# Patient Record
Sex: MALE | Race: WHITE | NOT HISPANIC OR LATINO
[De-identification: names, ages, dates, MRNs, and addresses within clinical notes are randomized per-mention and may not be internally consistent; named-entity substitution may affect disease eponyms.]

---

## 2019-08-13 PROBLEM — Z00.00 ENCOUNTER FOR PREVENTIVE HEALTH EXAMINATION: Status: ACTIVE | Noted: 2019-08-13

## 2019-09-05 ENCOUNTER — APPOINTMENT (OUTPATIENT)
Dept: DERMATOLOGY | Facility: CLINIC | Age: 59
End: 2019-09-05
Payer: COMMERCIAL

## 2019-09-05 VITALS — BODY MASS INDEX: 26.41 KG/M2 | WEIGHT: 195 LBS | HEIGHT: 72 IN

## 2019-09-05 DIAGNOSIS — Z78.9 OTHER SPECIFIED HEALTH STATUS: ICD-10-CM

## 2019-09-05 PROCEDURE — 99203 OFFICE O/P NEW LOW 30 MIN: CPT | Mod: 25

## 2019-09-05 PROCEDURE — 11102 TANGNTL BX SKIN SINGLE LES: CPT

## 2019-09-05 NOTE — ASSESSMENT
[FreeTextEntry1] : Psoriasis on elbows and left upper intragluteal cleft area\par Actinic keratoses on scalp\par Seborrheic keratosis on left upper abdomen\par ? Cyst,? Neurofibroma left lateral temple\par ? Basal cell carcinoma,? Traumatic left upper forehead

## 2019-09-05 NOTE — PHYSICAL EXAM
[Alert] : alert [Oriented x 3] : ~L oriented x 3 [Well Nourished] : well nourished [FreeTextEntry3] : The following areas were examined and no significant abnormalities were seen except as noted below:\par \par Type II skin\par \par scalp, face, eyelids, nose, lips, ears, neck, chest, abdomen, back,groin, buttocks, right arm, left arm, right hand, left hand,\par right  leg, left leg, right foot, left foot\par \par Crown of scalp: Multiple small thin pink scaly macules\par Left upper forehead: 15 x 15 mm pink patch with 5 x 5 mm central ulceration\par Left lateral temple:  7 x 5 mm soft pink nodule\par Mild to moderately scaly plaques present on both elbows and left upper inner buttock\par Left upper abdomen: 9X 3 mm brown verrucous plaque\par \par No other suspicious lesions seen

## 2019-09-05 NOTE — HISTORY OF PRESENT ILLNESS
[FreeTextEntry1] : Sore on the forehead [de-identified] : First visit for 59-year-old white male presenting for evaluation of growths. Particularly concerned about a three-month history of a nonhealing sore on the left forehead which patient attributes to wearing a bike helmet. No history of skin cancer.

## 2019-09-13 ENCOUNTER — RESULT REVIEW (OUTPATIENT)
Age: 59
End: 2019-09-13

## 2019-09-18 LAB — CORE LAB BIOPSY: NORMAL

## 2019-10-08 ENCOUNTER — APPOINTMENT (OUTPATIENT)
Dept: DERMATOLOGY | Facility: CLINIC | Age: 59
End: 2019-10-08

## 2021-11-23 ENCOUNTER — APPOINTMENT (OUTPATIENT)
Dept: DERMATOLOGY | Facility: CLINIC | Age: 61
End: 2021-11-23
Payer: COMMERCIAL

## 2021-11-23 DIAGNOSIS — D48.5 NEOPLASM OF UNCERTAIN BEHAVIOR OF SKIN: ICD-10-CM

## 2021-11-23 PROCEDURE — 11103 TANGNTL BX SKIN EA SEP/ADDL: CPT

## 2021-11-23 PROCEDURE — 11102 TANGNTL BX SKIN SINGLE LES: CPT

## 2021-11-23 PROCEDURE — 99213 OFFICE O/P EST LOW 20 MIN: CPT | Mod: 25

## 2021-11-23 NOTE — PHYSICAL EXAM
[FreeTextEntry3] : The following areas were examined and no significant abnormalities were seen except as noted below:\par \par Type II skin\par \par scalp, face, eyelids, nose, lips, ears, neck, chest, abdomen, back,groin, buttocks, right arm, left arm, right hand, left hand,\par right  leg, left leg, right foot, left foot\par \par Left crown of scalp: 13 x 9 mm pink scaly verrucous plaque\par Scalp: Multiple small pink scaly macules with one scaly papule present on the left anterior scalp\par Face: Rare small pink scaly macules present on the left cheek\par Mid forehead: No evidence recurrence of the previously treated basal cell carcinoma\par Trunk: Rare brown verrucous plaques\par Left distal thigh: 11 x 11 mm pink scaly patch\par Left upper buttock: Focal lichenified oval erythematous plaque \par Right lateral leg: Ill-defined pink scaly patch\par \par No other suspicious lesions

## 2021-11-23 NOTE — ASSESSMENT
[FreeTextEntry1] : ? Seborrheic keratosis, rule out SCC on left scalp\par ? BCC, ? Squamous cell carcinoma in situ on left thigh\par \par

## 2021-11-23 NOTE — CONSULT LETTER
[Dear  ___] : Dear  [unfilled], [Consult Letter:] : I had the pleasure of evaluating your patient, [unfilled]. [Consult Closing:] : Thank you very much for allowing me to participate in the care of this patient.  If you have any questions, please do not hesitate to contact me. [Sincerely,] : Sincerely, [FreeTextEntry2] : Ariadna Davis MD [FreeTextEntry1] : Examination of his skin reveals lesions in the left scalp and left thigh. While the lesion on the scalp lesion may be a seborrheic keratosis, it was biopsied to rule out potential squamous cell carcinoma. The lesion in the left thigh is most likely either a basal cell carcinoma or squamous cell carcinoma in situ.  Both lesions were biopsied today and will be eradicated if the pathology is confirmatory.\par \par Please see attached chart note for further details. [FreeTextEntry3] : Eugene Hunter MD\par [address], [address]\par [place] [address]\par Tel ([phone])\par Fax ([phone])\par Private line ([phone])\par

## 2021-11-23 NOTE — HISTORY OF PRESENT ILLNESS
[FreeTextEntry1] : Evaluation of growths. [de-identified] : Followup visit for 61-year-old white male last seen him on September 5, 2019, for evaluation of growths.\par Particularly concerned about a new lesion on the scalp and a lesion on the left thigh\par \par Patient has a history of basal cell carcinoma on the left forehead which was eradicated by Mohs surgery.\par Note: Patient has history of psoriasis but did not desire treatment at the last visit.

## 2021-12-08 ENCOUNTER — NON-APPOINTMENT (OUTPATIENT)
Age: 61
End: 2021-12-08

## 2021-12-13 ENCOUNTER — APPOINTMENT (OUTPATIENT)
Dept: DERMATOLOGY | Facility: CLINIC | Age: 61
End: 2021-12-13
Payer: COMMERCIAL

## 2021-12-13 DIAGNOSIS — C44.719 BASAL CELL CARCINOMA OF SKIN OF LEFT LOWER LIMB, INCLUDING HIP: ICD-10-CM

## 2021-12-13 PROCEDURE — 17262 DSTRJ MAL LES T/A/L 1.1-2.0: CPT

## 2021-12-13 PROCEDURE — 99213 OFFICE O/P EST LOW 20 MIN: CPT | Mod: 25

## 2021-12-13 NOTE — HISTORY OF PRESENT ILLNESS
[FreeTextEntry1] : Basal cell carcinoma [de-identified] : Followup visit for a 61-year-old white male presenting for eradication via D & C. of an 11 x 11 mm biopsy proven basal cell carcinoma, superficial or nodular type,  on the left distal thigh\par \par She also complains of itchy rash on the right mid leg: Treated with daughter's prescription cream without improvement.

## 2021-12-13 NOTE — ASSESSMENT
[FreeTextEntry1] : Rash on right leg consistent with an eczematous dermatitis of some type,?? Psoriasis

## 2021-12-13 NOTE — PHYSICAL EXAM
[Alert] : alert [Oriented x 3] : ~L oriented x 3 [Well Nourished] : well nourished [FreeTextEntry3] : Left distal thigh: Wound healing with a dry crust and no evidence of infection\par \par Right mid lateral leg:  Moderate pink scaled crusted plaque without elevated edges

## 2022-06-09 ENCOUNTER — APPOINTMENT (OUTPATIENT)
Dept: DERMATOLOGY | Facility: CLINIC | Age: 62
End: 2022-06-09
Payer: COMMERCIAL

## 2022-06-09 DIAGNOSIS — L30.8 OTHER SPECIFIED DERMATITIS: ICD-10-CM

## 2022-06-09 PROCEDURE — 99213 OFFICE O/P EST LOW 20 MIN: CPT | Mod: 25

## 2022-06-09 PROCEDURE — 17000 DESTRUCT PREMALG LESION: CPT

## 2022-06-09 RX ORDER — BETAMETHASONE DIPROPIONATE 0.5 MG/G
0.05 CREAM, AUGMENTED TOPICAL
Qty: 1 | Refills: 2 | Status: ACTIVE | COMMUNITY
Start: 2021-12-13 | End: 1900-01-01

## 2022-06-09 NOTE — ASSESSMENT
[FreeTextEntry1] : Eczematous dermatitis of some type, ? Psoriasis on the right mid lateral leg\par Actinic keratoses on scalp\par ? Incipient basal cell carcinoma on left breast

## 2022-06-09 NOTE — HISTORY OF PRESENT ILLNESS
[FreeTextEntry1] : Evaluation of growths [de-identified] : Follow-up visit for 62-year-old white male last seen by me on December 13, 2021, for evaluation of growths.\par Patient has a history of a basal cell carcinoma on the left distal thigh which was eradicated via D&C at the last visit.\par \par Patient also has a history of a rash on the right leg consistent with an eczematous dermatitis of some type,??  Psoriasis.\par This was treated with betamethasone dipropionate augmented cream 0.05% 2-3 times a day as needed at the last visit. - Patient was not able to get this medication.

## 2022-06-09 NOTE — PHYSICAL EXAM
[Alert] : alert [Oriented x 3] : ~L oriented x 3 [Well Nourished] : well nourished [FreeTextEntry3] : The following areas were examined and no significant abnormalities were seen except as noted below:\par \par Type II skin\par \par scalp, face, eyelids, nose, lips, ears, neck, chest, abdomen, back,groin, buttocks, right arm, left arm, right hand, left hand,\par right  leg, left leg, right foot, left foot\par \par The following areas were examined and no significant abnormalities were seen except as noted below:\par \par Type II skin\par \par scalp, face, eyelids, nose, lips, ears, neck, chest, abdomen, back,groin, buttocks, right arm, left arm, right hand, left hand,\par right  leg, left leg, right foot, left foot\par \par Scalp: Notable small pink scaly macules\par \par Left distal thigh: Wound healed with a pink focally elevated plaque and peripheral hyperpigmentation - no obvious evidence of recurrence\par Left upper breast:  5 x 4 mm pink smooth papule\par Trauma: Rare brown verrucous plaques\par Right mid lateral leg:  Moderate pink slightly scaly plaque\par Smaller similar plaques present on the right elbow and lower back/intergluteal cleft areas\par \par No other suspicious lesions seen

## 2022-06-14 ENCOUNTER — APPOINTMENT (OUTPATIENT)
Dept: DERMATOLOGY | Facility: CLINIC | Age: 62
End: 2022-06-14

## 2022-08-31 ENCOUNTER — NON-APPOINTMENT (OUTPATIENT)
Age: 62
End: 2022-08-31

## 2023-09-28 ENCOUNTER — APPOINTMENT (OUTPATIENT)
Dept: DERMATOLOGY | Facility: CLINIC | Age: 63
End: 2023-09-28
Payer: COMMERCIAL

## 2023-09-28 DIAGNOSIS — L40.0 PSORIASIS VULGARIS: ICD-10-CM

## 2023-09-28 DIAGNOSIS — L82.1 OTHER SEBORRHEIC KERATOSIS: ICD-10-CM

## 2023-09-28 DIAGNOSIS — Z85.828 PERSONAL HISTORY OF OTHER MALIGNANT NEOPLASM OF SKIN: ICD-10-CM

## 2023-09-28 DIAGNOSIS — L57.0 ACTINIC KERATOSIS: ICD-10-CM

## 2023-09-28 PROCEDURE — 17000 DESTRUCT PREMALG LESION: CPT

## 2023-09-28 PROCEDURE — 99213 OFFICE O/P EST LOW 20 MIN: CPT | Mod: 25

## 2024-08-01 ENCOUNTER — APPOINTMENT (OUTPATIENT)
Dept: DERMATOLOGY | Facility: CLINIC | Age: 64
End: 2024-08-01
Payer: SELF-PAY

## 2024-08-01 DIAGNOSIS — L82.1 OTHER SEBORRHEIC KERATOSIS: ICD-10-CM

## 2024-08-01 DIAGNOSIS — Z85.828 PERSONAL HISTORY OF OTHER MALIGNANT NEOPLASM OF SKIN: ICD-10-CM

## 2024-08-01 DIAGNOSIS — L40.0 PSORIASIS VULGARIS: ICD-10-CM

## 2024-08-01 DIAGNOSIS — L57.0 ACTINIC KERATOSIS: ICD-10-CM

## 2024-08-01 PROCEDURE — 99213 OFFICE O/P EST LOW 20 MIN: CPT

## 2024-08-01 NOTE — ASSESSMENT
[FreeTextEntry1] : Incipient seborrheic keratoses on right anterior scalp and mid forehead Extensive actinic keratoses on scalp-also present on face-especially nose

## 2024-08-01 NOTE — HISTORY OF PRESENT ILLNESS
[FreeTextEntry1] : Evaluation of growths [de-identified] : Follow-up visit for 63-year-old white male last seen by me on September 28, 2023, for evaluation of growths.   Particularly concerned about a lesion on the mid forehead area.  Particularly concerned about lesions on the forehead.  Patient has history of basal cell carcinoma on the left distal thigh eradicated by D & C in December, 2021. Patient has history of other basal cell carcinomas treated in the past, some by Mohs surgery.  Patient also has a history of a rash on the right elbow, lower back/intergluteal cleft, and right mid lateral leg consistent with either psoriasis or an eczematous dermatitis of some type.  This was treated with: Start betamethasone dipropionate augmented cream 0.05% twice a day as needed This has improved

## 2024-08-01 NOTE — PLAN
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